# Patient Record
Sex: FEMALE | Race: OTHER | NOT HISPANIC OR LATINO
[De-identification: names, ages, dates, MRNs, and addresses within clinical notes are randomized per-mention and may not be internally consistent; named-entity substitution may affect disease eponyms.]

---

## 2021-07-28 PROBLEM — Z00.00 ENCOUNTER FOR PREVENTIVE HEALTH EXAMINATION: Status: ACTIVE | Noted: 2021-07-28

## 2021-08-03 ENCOUNTER — APPOINTMENT (OUTPATIENT)
Dept: OBGYN | Facility: CLINIC | Age: 41
End: 2021-08-03
Payer: COMMERCIAL

## 2021-08-03 ENCOUNTER — ASOB RESULT (OUTPATIENT)
Age: 41
End: 2021-08-03

## 2021-08-03 VITALS
BODY MASS INDEX: 27.19 KG/M2 | HEART RATE: 84 BPM | DIASTOLIC BLOOD PRESSURE: 73 MMHG | HEIGHT: 61 IN | WEIGHT: 144 LBS | SYSTOLIC BLOOD PRESSURE: 109 MMHG

## 2021-08-03 PROCEDURE — 99203 OFFICE O/P NEW LOW 30 MIN: CPT

## 2021-08-03 PROCEDURE — 76801 OB US < 14 WKS SINGLE FETUS: CPT

## 2021-08-27 ENCOUNTER — APPOINTMENT (OUTPATIENT)
Dept: ANTEPARTUM | Facility: CLINIC | Age: 41
End: 2021-08-27
Payer: COMMERCIAL

## 2021-08-27 ENCOUNTER — NON-APPOINTMENT (OUTPATIENT)
Age: 41
End: 2021-08-27

## 2021-08-27 ENCOUNTER — ASOB RESULT (OUTPATIENT)
Age: 41
End: 2021-08-27

## 2021-08-27 VITALS — BODY MASS INDEX: 27.02 KG/M2 | WEIGHT: 143 LBS

## 2021-08-27 PROCEDURE — 76813 OB US NUCHAL MEAS 1 GEST: CPT

## 2021-08-30 LAB
1ST TRIMESTER DATA: NORMAL
ADDENDUM DOC: NORMAL
AFP PNL SERPL: NORMAL
AFP SERPL-ACNC: NORMAL
CLINICAL BIOCHEMIST REVIEW: NORMAL
FREE BETA HCG 1ST TRIMESTER: NORMAL
Lab: NORMAL
NOTES NTD: NORMAL
NT: NORMAL
PAPP-A SERPL-ACNC: NORMAL
TRISOMY 18/3: NORMAL

## 2021-09-10 ENCOUNTER — APPOINTMENT (OUTPATIENT)
Dept: OBGYN | Facility: CLINIC | Age: 41
End: 2021-09-10
Payer: COMMERCIAL

## 2021-09-10 ENCOUNTER — ASOB RESULT (OUTPATIENT)
Age: 41
End: 2021-09-10

## 2021-09-10 VITALS
HEART RATE: 101 BPM | WEIGHT: 145.44 LBS | SYSTOLIC BLOOD PRESSURE: 109 MMHG | DIASTOLIC BLOOD PRESSURE: 70 MMHG | HEIGHT: 61 IN | BODY MASS INDEX: 27.46 KG/M2

## 2021-09-10 PROCEDURE — 76815 OB US LIMITED FETUS(S): CPT

## 2021-09-10 PROCEDURE — 0500F INITIAL PRENATAL CARE VISIT: CPT

## 2021-09-24 ENCOUNTER — TRANSCRIPTION ENCOUNTER (OUTPATIENT)
Age: 41
End: 2021-09-24

## 2021-09-28 ENCOUNTER — TRANSCRIPTION ENCOUNTER (OUTPATIENT)
Age: 41
End: 2021-09-28

## 2021-10-06 ENCOUNTER — TRANSCRIPTION ENCOUNTER (OUTPATIENT)
Age: 41
End: 2021-10-06

## 2021-10-08 ENCOUNTER — APPOINTMENT (OUTPATIENT)
Dept: OBGYN | Facility: CLINIC | Age: 41
End: 2021-10-08
Payer: COMMERCIAL

## 2021-10-08 ENCOUNTER — LABORATORY RESULT (OUTPATIENT)
Age: 41
End: 2021-10-08

## 2021-10-08 VITALS
HEART RATE: 88 BPM | SYSTOLIC BLOOD PRESSURE: 112 MMHG | HEIGHT: 61 IN | DIASTOLIC BLOOD PRESSURE: 75 MMHG | BODY MASS INDEX: 28.15 KG/M2 | WEIGHT: 149.13 LBS

## 2021-10-08 PROCEDURE — 0502F SUBSEQUENT PRENATAL CARE: CPT

## 2021-10-13 ENCOUNTER — TRANSCRIPTION ENCOUNTER (OUTPATIENT)
Age: 41
End: 2021-10-13

## 2021-10-19 ENCOUNTER — APPOINTMENT (OUTPATIENT)
Dept: ANTEPARTUM | Facility: CLINIC | Age: 41
End: 2021-10-19
Payer: COMMERCIAL

## 2021-10-19 ENCOUNTER — ASOB RESULT (OUTPATIENT)
Age: 41
End: 2021-10-19

## 2021-10-19 PROCEDURE — 76811 OB US DETAILED SNGL FETUS: CPT

## 2021-11-01 ENCOUNTER — TRANSCRIPTION ENCOUNTER (OUTPATIENT)
Age: 41
End: 2021-11-01

## 2021-11-05 ENCOUNTER — APPOINTMENT (OUTPATIENT)
Dept: OBGYN | Facility: CLINIC | Age: 41
End: 2021-11-05
Payer: COMMERCIAL

## 2021-11-05 VITALS
BODY MASS INDEX: 28.89 KG/M2 | DIASTOLIC BLOOD PRESSURE: 78 MMHG | SYSTOLIC BLOOD PRESSURE: 123 MMHG | HEIGHT: 61 IN | WEIGHT: 153 LBS | HEART RATE: 112 BPM

## 2021-11-05 PROCEDURE — 0502F SUBSEQUENT PRENATAL CARE: CPT

## 2021-12-03 ENCOUNTER — APPOINTMENT (OUTPATIENT)
Dept: OBGYN | Facility: CLINIC | Age: 41
End: 2021-12-03
Payer: COMMERCIAL

## 2021-12-03 VITALS
BODY MASS INDEX: 29.83 KG/M2 | HEART RATE: 97 BPM | HEIGHT: 61 IN | DIASTOLIC BLOOD PRESSURE: 79 MMHG | SYSTOLIC BLOOD PRESSURE: 124 MMHG | WEIGHT: 158 LBS

## 2021-12-03 DIAGNOSIS — Z23 ENCOUNTER FOR IMMUNIZATION: ICD-10-CM

## 2021-12-03 DIAGNOSIS — Z34.92 ENCOUNTER FOR SUPERVISION OF NORMAL PREGNANCY, UNSPECIFIED, SECOND TRIMESTER: ICD-10-CM

## 2021-12-03 PROCEDURE — 90472 IMMUNIZATION ADMIN EACH ADD: CPT

## 2021-12-03 PROCEDURE — G0008: CPT

## 2021-12-03 PROCEDURE — 90686 IIV4 VACC NO PRSV 0.5 ML IM: CPT

## 2021-12-04 LAB
BASOPHILS # BLD AUTO: 0.01 K/UL
BASOPHILS NFR BLD AUTO: 0.2 %
EOSINOPHIL # BLD AUTO: 0.04 K/UL
EOSINOPHIL NFR BLD AUTO: 0.7 %
GLUCOSE 1H P 50 G GLC PO SERPL-MCNC: 70 MG/DL
HCT VFR BLD CALC: 34 %
HGB BLD-MCNC: 10.8 G/DL
IMM GRANULOCYTES NFR BLD AUTO: 0.5 %
LYMPHOCYTES # BLD AUTO: 1.28 K/UL
LYMPHOCYTES NFR BLD AUTO: 22.4 %
MAN DIFF?: NORMAL
MCHC RBC-ENTMCNC: 29.1 PG
MCHC RBC-ENTMCNC: 31.8 GM/DL
MCV RBC AUTO: 91.6 FL
MONOCYTES # BLD AUTO: 0.62 K/UL
MONOCYTES NFR BLD AUTO: 10.8 %
NEUTROPHILS # BLD AUTO: 3.74 K/UL
NEUTROPHILS NFR BLD AUTO: 65.4 %
PLATELET # BLD AUTO: 308 K/UL
RBC # BLD: 3.71 M/UL
RBC # FLD: 13.8 %
WBC # FLD AUTO: 5.72 K/UL

## 2021-12-08 ENCOUNTER — TRANSCRIPTION ENCOUNTER (OUTPATIENT)
Age: 41
End: 2021-12-08

## 2021-12-08 ENCOUNTER — NON-APPOINTMENT (OUTPATIENT)
Age: 41
End: 2021-12-08

## 2021-12-08 DIAGNOSIS — M79.669 PAIN IN UNSPECIFIED LOWER LEG: ICD-10-CM

## 2021-12-09 ENCOUNTER — NON-APPOINTMENT (OUTPATIENT)
Age: 41
End: 2021-12-09

## 2021-12-17 ENCOUNTER — OUTPATIENT (OUTPATIENT)
Dept: OUTPATIENT SERVICES | Facility: HOSPITAL | Age: 41
LOS: 1 days | End: 2021-12-17
Payer: COMMERCIAL

## 2021-12-17 ENCOUNTER — APPOINTMENT (OUTPATIENT)
Dept: ULTRASOUND IMAGING | Facility: IMAGING CENTER | Age: 41
End: 2021-12-17
Payer: COMMERCIAL

## 2021-12-17 DIAGNOSIS — M79.669 PAIN IN UNSPECIFIED LOWER LEG: ICD-10-CM

## 2021-12-17 PROCEDURE — 93970 EXTREMITY STUDY: CPT | Mod: 26

## 2021-12-17 PROCEDURE — 93970 EXTREMITY STUDY: CPT

## 2022-01-05 ENCOUNTER — NON-APPOINTMENT (OUTPATIENT)
Age: 42
End: 2022-01-05

## 2022-01-05 ENCOUNTER — APPOINTMENT (OUTPATIENT)
Dept: OBGYN | Facility: CLINIC | Age: 42
End: 2022-01-05
Payer: COMMERCIAL

## 2022-01-05 VITALS
SYSTOLIC BLOOD PRESSURE: 117 MMHG | BODY MASS INDEX: 30.96 KG/M2 | WEIGHT: 164 LBS | TEMPERATURE: 97.2 F | DIASTOLIC BLOOD PRESSURE: 78 MMHG | HEIGHT: 61 IN

## 2022-01-05 PROCEDURE — 90471 IMMUNIZATION ADMIN: CPT

## 2022-01-05 PROCEDURE — 0502F SUBSEQUENT PRENATAL CARE: CPT

## 2022-01-05 PROCEDURE — 90715 TDAP VACCINE 7 YRS/> IM: CPT

## 2022-01-11 ENCOUNTER — ASOB RESULT (OUTPATIENT)
Age: 42
End: 2022-01-11

## 2022-01-11 ENCOUNTER — APPOINTMENT (OUTPATIENT)
Dept: ANTEPARTUM | Facility: CLINIC | Age: 42
End: 2022-01-11
Payer: COMMERCIAL

## 2022-01-11 PROCEDURE — 76816 OB US FOLLOW-UP PER FETUS: CPT

## 2022-01-19 ENCOUNTER — APPOINTMENT (OUTPATIENT)
Dept: OBGYN | Facility: CLINIC | Age: 42
End: 2022-01-19
Payer: COMMERCIAL

## 2022-01-19 VITALS
HEART RATE: 103 BPM | BODY MASS INDEX: 31.34 KG/M2 | WEIGHT: 166 LBS | DIASTOLIC BLOOD PRESSURE: 79 MMHG | HEIGHT: 61 IN | SYSTOLIC BLOOD PRESSURE: 121 MMHG

## 2022-01-19 PROCEDURE — 0502F SUBSEQUENT PRENATAL CARE: CPT

## 2022-01-20 ENCOUNTER — NON-APPOINTMENT (OUTPATIENT)
Age: 42
End: 2022-01-20

## 2022-01-20 LAB
BASOPHILS # BLD AUTO: 0.01 K/UL
BASOPHILS NFR BLD AUTO: 0.1 %
EOSINOPHIL # BLD AUTO: 0.04 K/UL
EOSINOPHIL NFR BLD AUTO: 0.6 %
HCT VFR BLD CALC: 36.5 %
HGB BLD-MCNC: 11.7 G/DL
IMM GRANULOCYTES NFR BLD AUTO: 0.7 %
LYMPHOCYTES # BLD AUTO: 1.37 K/UL
LYMPHOCYTES NFR BLD AUTO: 19.2 %
MAN DIFF?: NORMAL
MCHC RBC-ENTMCNC: 28.7 PG
MCHC RBC-ENTMCNC: 32.1 GM/DL
MCV RBC AUTO: 89.7 FL
MONOCYTES # BLD AUTO: 0.53 K/UL
MONOCYTES NFR BLD AUTO: 7.4 %
NEUTROPHILS # BLD AUTO: 5.13 K/UL
NEUTROPHILS NFR BLD AUTO: 72 %
PLATELET # BLD AUTO: 290 K/UL
RBC # BLD: 4.07 M/UL
RBC # FLD: 13.4 %
WBC # FLD AUTO: 7.13 K/UL

## 2022-02-03 ENCOUNTER — NON-APPOINTMENT (OUTPATIENT)
Age: 42
End: 2022-02-03

## 2022-02-03 ENCOUNTER — APPOINTMENT (OUTPATIENT)
Dept: OBGYN | Facility: CLINIC | Age: 42
End: 2022-02-03
Payer: COMMERCIAL

## 2022-02-03 VITALS
HEART RATE: 103 BPM | BODY MASS INDEX: 32.1 KG/M2 | SYSTOLIC BLOOD PRESSURE: 131 MMHG | HEIGHT: 61 IN | TEMPERATURE: 96.3 F | WEIGHT: 170 LBS | DIASTOLIC BLOOD PRESSURE: 75 MMHG

## 2022-02-03 PROCEDURE — 0502F SUBSEQUENT PRENATAL CARE: CPT

## 2022-02-11 ENCOUNTER — ASOB RESULT (OUTPATIENT)
Age: 42
End: 2022-02-11

## 2022-02-11 ENCOUNTER — APPOINTMENT (OUTPATIENT)
Dept: ANTEPARTUM | Facility: CLINIC | Age: 42
End: 2022-02-11
Payer: COMMERCIAL

## 2022-02-11 ENCOUNTER — APPOINTMENT (OUTPATIENT)
Dept: OBGYN | Facility: CLINIC | Age: 42
End: 2022-02-11
Payer: COMMERCIAL

## 2022-02-11 VITALS
DIASTOLIC BLOOD PRESSURE: 81 MMHG | WEIGHT: 172.4 LBS | BODY MASS INDEX: 32.55 KG/M2 | HEIGHT: 61 IN | SYSTOLIC BLOOD PRESSURE: 130 MMHG | HEART RATE: 97 BPM

## 2022-02-11 LAB
BASOPHILS # BLD AUTO: 0.01 K/UL
BASOPHILS NFR BLD AUTO: 0.2 %
EOSINOPHIL # BLD AUTO: 0.03 K/UL
EOSINOPHIL NFR BLD AUTO: 0.5 %
HCT VFR BLD CALC: 36.7 %
HGB BLD-MCNC: 12.1 G/DL
HIV1+2 AB SPEC QL IA.RAPID: NONREACTIVE
IMM GRANULOCYTES NFR BLD AUTO: 0.3 %
LYMPHOCYTES # BLD AUTO: 1.23 K/UL
LYMPHOCYTES NFR BLD AUTO: 21.5 %
MAN DIFF?: NORMAL
MCHC RBC-ENTMCNC: 29.2 PG
MCHC RBC-ENTMCNC: 33 GM/DL
MCV RBC AUTO: 88.4 FL
MONOCYTES # BLD AUTO: 0.44 K/UL
MONOCYTES NFR BLD AUTO: 7.7 %
NEUTROPHILS # BLD AUTO: 4 K/UL
NEUTROPHILS NFR BLD AUTO: 69.8 %
PLATELET # BLD AUTO: 286 K/UL
RBC # BLD: 4.15 M/UL
RBC # FLD: 13.7 %
T PALLIDUM AB SER QL IA: NEGATIVE
WBC # FLD AUTO: 5.73 K/UL

## 2022-02-11 PROCEDURE — 76819 FETAL BIOPHYS PROFIL W/O NST: CPT

## 2022-02-11 PROCEDURE — 76816 OB US FOLLOW-UP PER FETUS: CPT

## 2022-02-11 PROCEDURE — 0502F SUBSEQUENT PRENATAL CARE: CPT

## 2022-02-13 LAB
GP B STREP DNA SPEC QL NAA+PROBE: NORMAL
GP B STREP DNA SPEC QL NAA+PROBE: NOT DETECTED
SOURCE GBS: NORMAL

## 2022-02-15 ENCOUNTER — APPOINTMENT (OUTPATIENT)
Dept: OBGYN | Facility: CLINIC | Age: 42
End: 2022-02-15
Payer: COMMERCIAL

## 2022-02-15 VITALS
BODY MASS INDEX: 32.56 KG/M2 | DIASTOLIC BLOOD PRESSURE: 78 MMHG | SYSTOLIC BLOOD PRESSURE: 123 MMHG | HEIGHT: 61 IN | WEIGHT: 172.44 LBS | HEART RATE: 89 BPM

## 2022-02-15 PROCEDURE — 0502F SUBSEQUENT PRENATAL CARE: CPT

## 2022-02-24 ENCOUNTER — APPOINTMENT (OUTPATIENT)
Dept: OBGYN | Facility: CLINIC | Age: 42
End: 2022-02-24
Payer: COMMERCIAL

## 2022-02-24 VITALS
HEIGHT: 61 IN | BODY MASS INDEX: 32.66 KG/M2 | SYSTOLIC BLOOD PRESSURE: 135 MMHG | DIASTOLIC BLOOD PRESSURE: 86 MMHG | HEART RATE: 91 BPM | WEIGHT: 173 LBS

## 2022-02-24 PROCEDURE — 0502F SUBSEQUENT PRENATAL CARE: CPT

## 2022-03-04 ENCOUNTER — APPOINTMENT (OUTPATIENT)
Dept: OBGYN | Facility: CLINIC | Age: 42
End: 2022-03-04
Payer: COMMERCIAL

## 2022-03-04 VITALS
HEIGHT: 61 IN | DIASTOLIC BLOOD PRESSURE: 80 MMHG | BODY MASS INDEX: 32.47 KG/M2 | SYSTOLIC BLOOD PRESSURE: 118 MMHG | WEIGHT: 172 LBS

## 2022-03-04 PROCEDURE — 0502F SUBSEQUENT PRENATAL CARE: CPT

## 2022-03-07 ENCOUNTER — TRANSCRIPTION ENCOUNTER (OUTPATIENT)
Age: 42
End: 2022-03-07

## 2022-03-09 ENCOUNTER — APPOINTMENT (OUTPATIENT)
Dept: OBGYN | Facility: CLINIC | Age: 42
End: 2022-03-09

## 2022-03-09 ENCOUNTER — NON-APPOINTMENT (OUTPATIENT)
Age: 42
End: 2022-03-09

## 2022-03-09 ENCOUNTER — INPATIENT (INPATIENT)
Facility: HOSPITAL | Age: 42
LOS: 3 days | Discharge: HOME | End: 2022-03-13
Attending: STUDENT IN AN ORGANIZED HEALTH CARE EDUCATION/TRAINING PROGRAM | Admitting: STUDENT IN AN ORGANIZED HEALTH CARE EDUCATION/TRAINING PROGRAM
Payer: COMMERCIAL

## 2022-03-09 VITALS
TEMPERATURE: 98 F | HEART RATE: 89 BPM | RESPIRATION RATE: 16 BRPM | SYSTOLIC BLOOD PRESSURE: 125 MMHG | DIASTOLIC BLOOD PRESSURE: 70 MMHG

## 2022-03-09 DIAGNOSIS — O41.00X0 OLIGOHYDRAMNIOS, UNSPECIFIED TRIMESTER, NOT APPLICABLE OR UNSPECIFIED: ICD-10-CM

## 2022-03-09 DIAGNOSIS — O26.899 OTHER SPECIFIED PREGNANCY RELATED CONDITIONS, UNSPECIFIED TRIMESTER: ICD-10-CM

## 2022-03-09 DIAGNOSIS — Z3A.00 WEEKS OF GESTATION OF PREGNANCY NOT SPECIFIED: ICD-10-CM

## 2022-03-09 LAB
BASOPHILS # BLD AUTO: 0.01 K/UL — SIGNIFICANT CHANGE UP (ref 0–0.2)
BASOPHILS NFR BLD AUTO: 0.2 % — SIGNIFICANT CHANGE UP (ref 0–2)
BLD GP AB SCN SERPL QL: NEGATIVE — SIGNIFICANT CHANGE UP
COVID-19 SPIKE DOMAIN AB INTERP: POSITIVE
COVID-19 SPIKE DOMAIN ANTIBODY RESULT: 210 U/ML — HIGH
EOSINOPHIL # BLD AUTO: 0.01 K/UL — SIGNIFICANT CHANGE UP (ref 0–0.5)
EOSINOPHIL NFR BLD AUTO: 0.2 % — SIGNIFICANT CHANGE UP (ref 0–6)
HCT VFR BLD CALC: 37 % — SIGNIFICANT CHANGE UP (ref 34.5–45)
HGB BLD-MCNC: 12.6 G/DL — SIGNIFICANT CHANGE UP (ref 11.5–15.5)
IANC: 3.71 K/UL — SIGNIFICANT CHANGE UP (ref 1.5–8.5)
IMM GRANULOCYTES NFR BLD AUTO: 0.5 % — SIGNIFICANT CHANGE UP (ref 0–1.5)
LYMPHOCYTES # BLD AUTO: 1.52 K/UL — SIGNIFICANT CHANGE UP (ref 1–3.3)
LYMPHOCYTES # BLD AUTO: 27 % — SIGNIFICANT CHANGE UP (ref 13–44)
MCHC RBC-ENTMCNC: 29.9 PG — SIGNIFICANT CHANGE UP (ref 27–34)
MCHC RBC-ENTMCNC: 34.1 GM/DL — SIGNIFICANT CHANGE UP (ref 32–36)
MCV RBC AUTO: 87.7 FL — SIGNIFICANT CHANGE UP (ref 80–100)
MONOCYTES # BLD AUTO: 0.34 K/UL — SIGNIFICANT CHANGE UP (ref 0–0.9)
MONOCYTES NFR BLD AUTO: 6 % — SIGNIFICANT CHANGE UP (ref 2–14)
NEUTROPHILS # BLD AUTO: 3.71 K/UL — SIGNIFICANT CHANGE UP (ref 1.8–7.4)
NEUTROPHILS NFR BLD AUTO: 66.1 % — SIGNIFICANT CHANGE UP (ref 43–77)
NRBC # BLD: 0 /100 WBCS — SIGNIFICANT CHANGE UP
NRBC # FLD: 0 K/UL — SIGNIFICANT CHANGE UP
PLATELET # BLD AUTO: 232 K/UL — SIGNIFICANT CHANGE UP (ref 150–400)
RBC # BLD: 4.22 M/UL — SIGNIFICANT CHANGE UP (ref 3.8–5.2)
RBC # FLD: 14 % — SIGNIFICANT CHANGE UP (ref 10.3–14.5)
RH IG SCN BLD-IMP: POSITIVE — SIGNIFICANT CHANGE UP
RH IG SCN BLD-IMP: POSITIVE — SIGNIFICANT CHANGE UP
SARS-COV-2 IGG+IGM SERPL QL IA: 210 U/ML — HIGH
SARS-COV-2 IGG+IGM SERPL QL IA: POSITIVE
SARS-COV-2 RNA SPEC QL NAA+PROBE: SIGNIFICANT CHANGE UP
WBC # BLD: 5.62 K/UL — SIGNIFICANT CHANGE UP (ref 3.8–10.5)
WBC # FLD AUTO: 5.62 K/UL — SIGNIFICANT CHANGE UP (ref 3.8–10.5)

## 2022-03-09 RX ORDER — OXYTOCIN 10 UNIT/ML
333.33 VIAL (ML) INJECTION
Qty: 20 | Refills: 0 | Status: DISCONTINUED | OUTPATIENT
Start: 2022-03-09 | End: 2022-03-11

## 2022-03-09 RX ORDER — SODIUM CHLORIDE 9 MG/ML
1000 INJECTION, SOLUTION INTRAVENOUS
Refills: 0 | Status: DISCONTINUED | OUTPATIENT
Start: 2022-03-09 | End: 2022-03-10

## 2022-03-09 RX ORDER — OXYTOCIN 10 UNIT/ML
333.33 VIAL (ML) INJECTION
Qty: 20 | Refills: 0 | Status: DISCONTINUED | OUTPATIENT
Start: 2022-03-09 | End: 2022-03-09

## 2022-03-09 RX ORDER — SODIUM CHLORIDE 9 MG/ML
1000 INJECTION, SOLUTION INTRAVENOUS
Refills: 0 | Status: DISCONTINUED | OUTPATIENT
Start: 2022-03-09 | End: 2022-03-09

## 2022-03-09 NOTE — OB RN PATIENT PROFILE - NS_OBGYNHISTORY_OBGYN_ALL_OB_FT
AP course uncomplicated.     OB HX: Denies   GYN HX : Abnormal pap with colposcopy- patient to f/u after delivery.

## 2022-03-09 NOTE — OB PROVIDER H&P - NSHPPHYSICALEXAM_GEN_ALL_CORE
Assessment reveals: VSS, abdomen soft non tender on palp.   Cat 1 FHT, Irregular contractions on toco.   SSE: No pooling, Nitrazine negative, negative FERN. Cervix appears closed.   SVE: 0/20/-3  T(C): 36.8 (03-09-22 @ 10:29), Max: 36.8 (03-09-22 @ 10:12)  HR: 71 (03-09-22 @ 11:14) (71 - 89)  BP: 111/67 (03-09-22 @ 11:14) (110/58 - 132/80)  RR: 16 (03-09-22 @ 10:12) (16 - 16)  SpO2: --

## 2022-03-09 NOTE — OB RN TRIAGE NOTE - FALL HARM RISK - UNIVERSAL INTERVENTIONS
Bed in lowest position, wheels locked, appropriate side rails in place/Call bell, personal items and telephone in reach/Instruct patient to call for assistance before getting out of bed or chair/Non-slip footwear when patient is out of bed/[place] to call system/Physically safe environment - no spills, clutter or unnecessary equipment/Purposeful Proactive Rounding/Room/bathroom lighting operational, light cord in reach

## 2022-03-09 NOTE — OB PROVIDER H&P - HISTORY OF PRESENT ILLNESS
41 year old  at 40.1 presents with c/o cramps that started around 0430 am, states the cramps subsided since being here. Patient states decreased fetal movement since 7-9 am, reports positive fetal movement. Patient states panties were wet and unsure if it was LOF or urine, states since she showered this AM denies any LOF @ this time ap care uncomplicated thus far

## 2022-03-09 NOTE — OB PROVIDER TRIAGE NOTE - NSHPPHYSICALEXAM_GEN_ALL_CORE
Assessment reveals: VSS, abdomen soft non tender on palp.   Cat 1 FHT, Irregular contractions on toco.   VE 0/0/-3  SSE: No pooling, Nitrazine negative, negative FERN. Cervix appears closed.     1114: 111/67 HR 71. Assessment reveals: VSS, abdomen soft non tender on palp.   Cat 1 FHT, Irregular contractions on toco.   SSE: No pooling, Nitrazine negative, negative FERN. Cervix appears closed.   SVE: 0/20/-3  T(C): 36.8 (03-09-22 @ 10:29), Max: 36.8 (03-09-22 @ 10:12)  HR: 71 (03-09-22 @ 11:14) (71 - 89)  BP: 111/67 (03-09-22 @ 11:14) (110/58 - 132/80)  RR: 16 (03-09-22 @ 10:12) (16 - 16)  SpO2: --

## 2022-03-09 NOTE — OB PROVIDER IHI INDUCTION/AUGMENTATION NOTE - NS_IHIPITOCINATTEND_OBGYN_ALL_OB_FT
Patient is in stable condition. Provided discharge instructions and follow-up information with understanding verbalized. Agrees to seek medical attention for any new or worsening condition.   Patient ambulated from ED with steady gait evy

## 2022-03-09 NOTE — OB PROVIDER TRIAGE NOTE - NSOBPROVIDERNOTE_OBGYN_ALL_OB_FT
41 year old  at 40.1 presents with c/o cramps that started at 0430 am but have subsided. Patient states decreased fetal movement from 7am but now reports positive fetal movement.   EFM monitoring Cat 1 FHT.   BPP to be done     Will continue to monitor. 41 year old  at 40.1 presents with c/o cramps that started at 0430 am but have subsided. Patient states decreased fetal movement from 7am but now reports positive fetal movement.   EFM monitoring Cat 1 FHT.   VE 0/0/-3  BPP to be done     Will continue to monitor.    1150: BPP 6/8. Vtx presentation, Anterior placenta, AYANA 1.93.   category 1 FHT  toco: irregular uterine contractions   Plan discussed with Dr. Newell   IOL with PO Cytotec.  GBS- neg 2022  pain management PRN

## 2022-03-09 NOTE — OB RN TRIAGE NOTE - NSICDXPASTMEDICALHX_GEN_ALL_CORE_FT
PAST MEDICAL HISTORY:  No pertinent past medical history PAST MEDICAL HISTORY:  History of blood transfusion at 6 years of ago d/t rxn to codeine

## 2022-03-09 NOTE — OB PROVIDER TRIAGE NOTE - NSICDXPASTMEDICALHX_GEN_ALL_CORE_FT
PAST MEDICAL HISTORY:  History of blood transfusion at 6 years of ago d/t rxn to codeine     PAST MEDICAL HISTORY:  History of blood transfusion at 6 years of age  d/t rxn to codeine

## 2022-03-09 NOTE — OB PROVIDER TRIAGE NOTE - HISTORY OF PRESENT ILLNESS
41 year old  at 40.1 presents with c/o cramps that started around 0430 am, states the cramps subsided since being here. Patient states decreased fetal movement since 7-9 am, reports positive fetal movement. Patient states panties were wet and unsure if it was LOF or urine.  41 year old  at 40.1 presents with c/o cramps that started around 0430 am, states the cramps subsided since being here. Patient states decreased fetal movement since 7-9 am, reports positive fetal movement. Patient states panties were wet and unsure if it was LOF or urine, states since she showered this AM denies any LOF @ this time ap care uncomplicated thus far

## 2022-03-09 NOTE — OB PROVIDER H&P - ASSESSMENT
41 year old  at 40.1 weeks gestation with Oligohydraminos AYANA 1.93.   Plan of care discussed with Dr. Newell   Admit to L&D for PO cytotec IOL   GBS negative      see admission orders  41 year old  at 40.1 weeks gestation with Oligohydraminos AYANA 1.93.   Plan of care discussed with Dr. Newell   Admit to L&D for PO cytotec IOL   GBS negative      see admission orders   Dr. Beverly notified of patient admission

## 2022-03-09 NOTE — OB PROVIDER TRIAGE NOTE - NS_OBGYNHISTORY_OBGYN_ALL_OB_FT
AP course uncomplicated.     OB HX: Denies   GYN: Abnormal pap with colposcopy- patient to f/u after delivery. AP course uncomplicated.     OB HX: Denies   GYN HX : Abnormal pap with colposcopy- patient to f/u after delivery.

## 2022-03-09 NOTE — OB RN TRIAGE NOTE - FALL HARM RISK - PATIENT NEEDS ASSISTANCE
"Anesthesia Transfer of Care Note    Patient: Charis Wells    Procedure(s) Performed: Procedure(s) (LRB):   SECTION (N/A)    Patient location: PACU    Anesthesia Type: spinal    Transport from OR: Transported from OR on room air with adequate spontaneous ventilation    Post pain: adequate analgesia    Post assessment: no apparent anesthetic complications    Post vital signs: stable    Level of consciousness: awake    Nausea/Vomiting: no nausea/vomiting    Complications: none    Transfer of care protocol was followed      Last vitals:   Visit Vitals  Ht 5' 3" (1.6 m)   Wt 93.9 kg (207 lb)   LMP 2019   Breastfeeding? No   BMI 36.67 kg/m²     " No assistance needed

## 2022-03-10 LAB
HBV SURFACE AG SERPL QL IA: SIGNIFICANT CHANGE UP
RUBV IGG SER-ACNC: 4.2 INDEX — SIGNIFICANT CHANGE UP
RUBV IGG SER-IMP: POSITIVE — SIGNIFICANT CHANGE UP
T PALLIDUM AB TITR SER: NEGATIVE — SIGNIFICANT CHANGE UP

## 2022-03-10 RX ORDER — OXYTOCIN 10 UNIT/ML
1 VIAL (ML) INJECTION
Qty: 30 | Refills: 0 | Status: DISCONTINUED | OUTPATIENT
Start: 2022-03-10 | End: 2022-03-11

## 2022-03-10 RX ADMIN — Medication 1 MILLIUNIT(S)/MIN: at 17:33

## 2022-03-11 ENCOUNTER — RESULT REVIEW (OUTPATIENT)
Age: 42
End: 2022-03-11

## 2022-03-11 ENCOUNTER — TRANSCRIPTION ENCOUNTER (OUTPATIENT)
Age: 42
End: 2022-03-11

## 2022-03-11 PROCEDURE — 88307 TISSUE EXAM BY PATHOLOGIST: CPT | Mod: 26

## 2022-03-11 PROCEDURE — 59400 OBSTETRICAL CARE: CPT | Mod: U9,UB,GC

## 2022-03-11 RX ORDER — IBUPROFEN 200 MG
600 TABLET ORAL EVERY 6 HOURS
Refills: 0 | Status: DISCONTINUED | OUTPATIENT
Start: 2022-03-11 | End: 2022-03-13

## 2022-03-11 RX ORDER — DIPHENHYDRAMINE HCL 50 MG
25 CAPSULE ORAL EVERY 6 HOURS
Refills: 0 | Status: DISCONTINUED | OUTPATIENT
Start: 2022-03-11 | End: 2022-03-13

## 2022-03-11 RX ORDER — IBUPROFEN 200 MG
1 TABLET ORAL
Qty: 0 | Refills: 0 | DISCHARGE
Start: 2022-03-11

## 2022-03-11 RX ORDER — LANOLIN
1 OINTMENT (GRAM) TOPICAL EVERY 6 HOURS
Refills: 0 | Status: DISCONTINUED | OUTPATIENT
Start: 2022-03-11 | End: 2022-03-13

## 2022-03-11 RX ORDER — ACETAMINOPHEN 500 MG
975 TABLET ORAL ONCE
Refills: 0 | Status: DISCONTINUED | OUTPATIENT
Start: 2022-03-11 | End: 2022-03-11

## 2022-03-11 RX ORDER — OXYTOCIN 10 UNIT/ML
333.33 VIAL (ML) INJECTION
Qty: 20 | Refills: 0 | Status: DISCONTINUED | OUTPATIENT
Start: 2022-03-11 | End: 2022-03-11

## 2022-03-11 RX ORDER — BENZOCAINE 10 %
1 GEL (GRAM) MUCOUS MEMBRANE EVERY 6 HOURS
Refills: 0 | Status: DISCONTINUED | OUTPATIENT
Start: 2022-03-11 | End: 2022-03-13

## 2022-03-11 RX ORDER — ERTAPENEM SODIUM 1 G/1
1000 INJECTION, POWDER, LYOPHILIZED, FOR SOLUTION INTRAMUSCULAR; INTRAVENOUS ONCE
Refills: 0 | Status: DISCONTINUED | OUTPATIENT
Start: 2022-03-11 | End: 2022-03-11

## 2022-03-11 RX ORDER — PRAMOXINE HYDROCHLORIDE 150 MG/15G
1 AEROSOL, FOAM RECTAL EVERY 4 HOURS
Refills: 0 | Status: DISCONTINUED | OUTPATIENT
Start: 2022-03-11 | End: 2022-03-13

## 2022-03-11 RX ORDER — ACETAMINOPHEN 500 MG
975 TABLET ORAL ONCE
Refills: 0 | Status: COMPLETED | OUTPATIENT
Start: 2022-03-11 | End: 2022-03-11

## 2022-03-11 RX ORDER — OXYTOCIN 10 UNIT/ML
2 VIAL (ML) INJECTION
Qty: 30 | Refills: 0 | Status: DISCONTINUED | OUTPATIENT
Start: 2022-03-11 | End: 2022-03-13

## 2022-03-11 RX ORDER — SODIUM CHLORIDE 9 MG/ML
3 INJECTION INTRAMUSCULAR; INTRAVENOUS; SUBCUTANEOUS EVERY 8 HOURS
Refills: 0 | Status: DISCONTINUED | OUTPATIENT
Start: 2022-03-11 | End: 2022-03-13

## 2022-03-11 RX ORDER — HYDROCORTISONE 1 %
1 OINTMENT (GRAM) TOPICAL EVERY 6 HOURS
Refills: 0 | Status: DISCONTINUED | OUTPATIENT
Start: 2022-03-11 | End: 2022-03-13

## 2022-03-11 RX ORDER — SODIUM CHLORIDE 9 MG/ML
1000 INJECTION, SOLUTION INTRAVENOUS
Refills: 0 | Status: DISCONTINUED | OUTPATIENT
Start: 2022-03-11 | End: 2022-03-11

## 2022-03-11 RX ORDER — KETOROLAC TROMETHAMINE 30 MG/ML
30 SYRINGE (ML) INJECTION ONCE
Refills: 0 | Status: DISCONTINUED | OUTPATIENT
Start: 2022-03-11 | End: 2022-03-13

## 2022-03-11 RX ORDER — ACETAMINOPHEN 500 MG
975 TABLET ORAL
Refills: 0 | Status: DISCONTINUED | OUTPATIENT
Start: 2022-03-11 | End: 2022-03-13

## 2022-03-11 RX ORDER — OXYTOCIN 10 UNIT/ML
VIAL (ML) INJECTION
Qty: 30 | Refills: 0 | Status: DISCONTINUED | OUTPATIENT
Start: 2022-03-11 | End: 2022-03-11

## 2022-03-11 RX ORDER — SIMETHICONE 80 MG/1
80 TABLET, CHEWABLE ORAL EVERY 4 HOURS
Refills: 0 | Status: DISCONTINUED | OUTPATIENT
Start: 2022-03-11 | End: 2022-03-13

## 2022-03-11 RX ORDER — MAGNESIUM HYDROXIDE 400 MG/1
30 TABLET, CHEWABLE ORAL
Refills: 0 | Status: DISCONTINUED | OUTPATIENT
Start: 2022-03-11 | End: 2022-03-13

## 2022-03-11 RX ORDER — ACETAMINOPHEN 500 MG
3 TABLET ORAL
Qty: 0 | Refills: 0 | DISCHARGE
Start: 2022-03-11

## 2022-03-11 RX ORDER — TETANUS TOXOID, REDUCED DIPHTHERIA TOXOID AND ACELLULAR PERTUSSIS VACCINE, ADSORBED 5; 2.5; 8; 8; 2.5 [IU]/.5ML; [IU]/.5ML; UG/.5ML; UG/.5ML; UG/.5ML
0.5 SUSPENSION INTRAMUSCULAR ONCE
Refills: 0 | Status: DISCONTINUED | OUTPATIENT
Start: 2022-03-11 | End: 2022-03-13

## 2022-03-11 RX ORDER — DIBUCAINE 1 %
1 OINTMENT (GRAM) RECTAL EVERY 6 HOURS
Refills: 0 | Status: DISCONTINUED | OUTPATIENT
Start: 2022-03-11 | End: 2022-03-13

## 2022-03-11 RX ORDER — AER TRAVELER 0.5 G/1
1 SOLUTION RECTAL; TOPICAL EVERY 4 HOURS
Refills: 0 | Status: DISCONTINUED | OUTPATIENT
Start: 2022-03-11 | End: 2022-03-13

## 2022-03-11 RX ORDER — SODIUM CHLORIDE 9 MG/ML
500 INJECTION, SOLUTION INTRAVENOUS ONCE
Refills: 0 | Status: COMPLETED | OUTPATIENT
Start: 2022-03-11 | End: 2022-03-11

## 2022-03-11 RX ORDER — OXYTOCIN 10 UNIT/ML
333.33 VIAL (ML) INJECTION
Qty: 20 | Refills: 0 | Status: DISCONTINUED | OUTPATIENT
Start: 2022-03-11 | End: 2022-03-13

## 2022-03-11 RX ORDER — IBUPROFEN 200 MG
600 TABLET ORAL EVERY 6 HOURS
Refills: 0 | Status: COMPLETED | OUTPATIENT
Start: 2022-03-11 | End: 2023-02-07

## 2022-03-11 RX ADMIN — Medication 600 MILLIGRAM(S): at 23:46

## 2022-03-11 RX ADMIN — Medication 975 MILLIGRAM(S): at 21:27

## 2022-03-11 RX ADMIN — Medication 975 MILLIGRAM(S): at 13:00

## 2022-03-11 RX ADMIN — Medication 975 MILLIGRAM(S): at 12:30

## 2022-03-11 RX ADMIN — Medication 1000 MILLIUNIT(S)/MIN: at 11:07

## 2022-03-11 RX ADMIN — Medication 975 MILLIGRAM(S): at 22:00

## 2022-03-11 RX ADMIN — SODIUM CHLORIDE 1000 MILLILITER(S): 9 INJECTION, SOLUTION INTRAVENOUS at 12:19

## 2022-03-11 NOTE — OB RN DELIVERY SUMMARY - BABY A: APGAR 5 MIN SCORE, DELIVERY
[Restricted in physically strenuous activity but ambulatory and able to carry out work of a light or sedentary nature] : Status 1- Restricted in physically strenuous activity but ambulatory and able to carry out work of a light or sedentary nature, e.g., light house work, office work [Obese] : obese [Normal] : affect appropriate [de-identified] : Eyeglasses noted [de-identified] : Some arthritic changes [de-identified] : Grade I peripheral edema, apparently not new. [de-identified] : However, obesity may be limiting the examination details. 9

## 2022-03-11 NOTE — DISCHARGE NOTE OB - PATIENT PORTAL LINK FT
You can access the FollowMyHealth Patient Portal offered by [facility] by registering at the following website: [email or web address]. By joining [facility]’s FollowMyHealth portal, you will also be able to view your health information using other applications (apps) compatible with our system.

## 2022-03-11 NOTE — OB NEONATOLOGY/PEDIATRICIAN DELIVERY SUMMARY - NSPEDSNEONOTESA_OBGYN_ALL_OB_FT
40.3 wk female born via  to a 42 y/o  blood type B+ mother. No significant maternal or prenatal history. PNL -/-/NR/I, GBS - on . SROM at 5:35 with clear fluids, approx. 3.5 hrs. Baby emerged vigorous, crying, was w/d/s/s with APGARS of 9/9. Mom plans to initiate breastfeeding, declines Hep B vaccine. EOS 0.48 with maternal temp of 38C within 1 hour of delivery. COVID negative. Admit to  nursery. Called to delivery for category 2 tracing. 40.3 wk female born via  to a 40 y/o  blood type B+ mother. No significant maternal or prenatal history. PNL -/-/NR/I, GBS - on . SROM at 5:35 with clear fluids, approx. 3.5 hrs. Baby emerged vigorous, crying, was w/d/s/s with APGARS of 9/9. Mom plans to initiate breastfeeding, declines Hep B vaccine. EOS 0.48 with maternal temp of 38C within 1 hour of delivery. COVID negative. Admit to  nursery.

## 2022-03-11 NOTE — OB PROVIDER LABOR PROGRESS NOTE - NS_OBIHIFHRDETAILS_OBGYN_ALL_OB_FT
150, mod chiqui, +accels, no decels
155, mod, +accels, + variables
130, mod chiqui, +accels, no decels
145/mod/(-)accels/(-)decels
145/mod/+accels no decels

## 2022-03-11 NOTE — PROVIDER CONTACT NOTE (OTHER) - ASSESSMENT
temp: 38.0 rectal, asymptomatic
Lying: /55 HR 76   Sitting: /54 HR 95  Standing: BP 81/50   pt denies any symptoms, pt returned to bed at this time.  pt firm at the umbilicus with light to moderate bleeding. pt returned to bed at this time.
FUNDUS FIRM AT UMBILICUS , BLADDER SLIGHTLY DISTENDED , MINIMAL VAGINAL BLEEDING ,

## 2022-03-11 NOTE — OB RN DELIVERY SUMMARY - NSSELHIDDEN_OBGYN_ALL_OB_FT
[NS_DeliveryAttending1_OBGYN_ALL_OB_FT:[email or web address]=] [NS_DeliveryAttending1_OBGYN_ALL_OB_FT:[email or web address]=],[NS_DeliveryRN_OBGYN_ALL_OB_FT:[id number]=],[NS_CirculateRN2_OBGYN_ALL_OB_FT:[id number]=]

## 2022-03-11 NOTE — DISCHARGE NOTE OB - CARE PROVIDERS DIRECT ADDRESSES
[email or web address]

## 2022-03-11 NOTE — DISCHARGE NOTE OB - MEDICATION SUMMARY - MEDICATIONS TO TAKE
I will START or STAY ON the medications listed below when I get home from the hospital:    ibuprofen 600 mg oral tablet  -- 1 tab(s) by mouth every 6 hours, As Needed  -- Indication: For Vaginal delivery    acetaminophen 325 mg oral tablet  -- 3 tab(s) by mouth every 8 hours, As Needed  -- Indication: For Vaginal delivery

## 2022-03-11 NOTE — OB RN DELIVERY SUMMARY - NS_LABORCHARACTER_OBGYN_ALL_OB
Augmentation of labor/Febrile (>38C)/External electronic FM Induction of labor-Medicinal/Augmentation of labor/Febrile (>38C)/External electronic FM

## 2022-03-11 NOTE — DISCHARGE NOTE OB - NS MD DC FALL RISK RISK
For information on Fall & Injury Prevention, visit: [email or web address] OR  [email or web address] OR  https://www.cdc.gov/steadi/patient.html

## 2022-03-11 NOTE — OB PROVIDER LABOR PROGRESS NOTE - ASSESSMENT
42yo  40w2d here for oligo IOL. Early in induction.    -c/w PO cytotec course  -epi PRN, patient declining at this time    D/w Dr. Miguel Beverly PGY-4
- s/p CRB  - Will c/w Pitocin  - Cat 1 tracing  - Epi/IV analgesia PRN     Garret Akers  PGY-1, Obstetrics & Gynecology 
#Labor   - c/w Pitocin  - Bag appreciated on SVE. Unclear if SROM vs. forebag    #Fetal Wellbeing   - Cat 1    #Pain Control   - sp Epidural     Garret Akers, PGY-1    Plan per Dr. Pimentel 
IOL for oligo now fully dilated and pushing. Fetal status is reassuring.    -continue pushing   -OA position    D/w Dr. Viviana Beverly PGY-4
-c/w pit  -pt to begin to push when feeling pressure. no sensation at this time    Celestina PGY2
Plan is to place CB given patient continues to be minimally dilated, albeit cervical effacement.    - CB placed@425p  - soft cervix, start pitocin 1x1    Amyeo Afroz Jereen, PGY-1  dw Dr Rivera

## 2022-03-11 NOTE — OB PROVIDER LABOR PROGRESS NOTE - NSVAGINALEXAM_OBGYN_ALL_OB_DT
10-Mar-2022 08:05
10-Mar-2022 16:26
11-Mar-2022 05:43
11-Mar-2022 07:00
11-Mar-2022 08:52
10-Mar-2022 21:22

## 2022-03-11 NOTE — OB PROVIDER LABOR PROGRESS NOTE - NS_SUBJECTIVE/OBJECTIVE_OBGYN_ALL_OB_FT
PGY1 Labor & Delivery Progress Note     Pt examined @ 0543 due to possible SROM    T(C): 36.6 (03-11-22 @ 05:37), Max: 37.3 (03-10-22 @ 17:33)  HR: 73 (03-11-22 @ 06:08) (53 - 91)  BP: 120/70 (03-11-22 @ 06:02) (96/50 - 147/65)  RR: --  SpO2: 100% (03-11-22 @ 06:08) (92% - 100%)
Patient assessed for CRB removal
Pt s/p PO cytotec course
At bedside for VE to start pushing
Pt seen and examined for cat 2 tracing  Ruptured forbag
At bedside for VE to assess labor progress

## 2022-03-11 NOTE — PROVIDER CONTACT NOTE (OTHER) - RECOMMENDATIONS
Pt encouraged to oral hydrate and have regular diet.
ENCOURAGED FLUIDS, AMBULATION ,ASSISTED TO BATHROOM ,

## 2022-03-11 NOTE — OB RN DELIVERY SUMMARY - NS_SEPSISRSKCALC_OBGYN_ALL_OB_FT
EOS calculated successfully. EOS Risk Factor: 0.5/1000 live births ([facility] national incidence); GA=40w3d; Temp=99.14; ROM=3.417; GBS='Negative'; Antibiotics='No antibiotics or any antibiotics < 2 hrs prior to birth'   EOS calculated successfully. EOS Risk Factor: 0.5/1000 live births ([facility] national incidence); GA=40w3d; Temp=100.4; ROM=3.417; GBS='Negative'; Antibiotics='No antibiotics or any antibiotics < 2 hrs prior to birth'

## 2022-03-11 NOTE — PROVIDER CONTACT NOTE (OTHER) - ACTION/TREATMENT ORDERED:
pt to receive Invanz 1g post partum.   no tylenol to be given at this time  con't to monitor
MD NOTIFIED
POC discussed with , pt to receive 500cc bolus of LR and repeat orthostatics after. Pt educated on POC and verbalizes understanding.

## 2022-03-11 NOTE — PROVIDER CONTACT NOTE (OTHER) - SITUATION
Pt febrile post partum
Pt unable to void freely  8 hours post , voided 2 pm  100 ml In L&D  , then 50 ML @1700 ,
Post orthostatics.

## 2022-03-11 NOTE — DISCHARGE NOTE OB - HOSPITAL COURSE
Pt admitted for IOL for oligohydramnios.  She delivered a viable female infant via .  Uncomplicated PP course

## 2022-03-11 NOTE — DISCHARGE NOTE OB - CARE PLAN
Principal Discharge DX:	Normal vaginal delivery  Assessment and plan of treatment:	Nothing per vagina for 6wks   1

## 2022-03-11 NOTE — DISCHARGE NOTE OB - CARE PROVIDER_API CALL
Luc More)  OBSGYN  General  [facility], [address] [place] [address]  Phone: [phone]  Fax: [phone]  Follow Up Time:

## 2022-03-11 NOTE — OB PROVIDER DELIVERY SUMMARY - NSPROVIDERDELIVERYNOTE_OBGYN_ALL_OB_FT
Spontaneous vaginal delivery of liveborn infant from [facility] under supervision of Dr Freitas. Head, shoulders and body delivered easily. Infant was suctioned and handed off to mother/peds. Placenta delivered intact with a 3 vessel cord. Fundal massage was given and uterine fundus was found to be firm. Vaginal exam revealed an intact cervix, vaginal walls and sulci. 2nd laceration was noted and repaired with 2.0 chromic suture. Excellent hemostasis noted. Patient was stable. Count was correct x2. AGPARS 9/9. Pt was febrile (38.0 degree celsium) right after delivery with no tachycardia or signs of fetal distress. The initial plan was for Invanz x1, however, pt has a PNC allergy. The updated plan per Dr Michelle is to watch for fevers. Patient is currently doing well. PPD1 CBC with diff to be obtained and followed.    Amyeo Afroz Jereen, PGY-1

## 2022-03-11 NOTE — PROVIDER CONTACT NOTE (OTHER) - BACKGROUND
@ 0900  3/11/22 WITH 2ND DEGREE LACCERATION  WITH  ML
 of viable female @ 0900.  with 2nd degree laceration.  IOL for oligo (AYANA 1.9) and decreased FM  gbs neg   covid neg
 of viable female @ 0900.  with 2nd degree laceration.  IOL for oligo (AYANA 1.9) and decreased FM  gbs neg   covid neg  febrile 38.0C rectal postpartum

## 2022-03-12 LAB
BASOPHILS # BLD AUTO: 0.01 K/UL — SIGNIFICANT CHANGE UP (ref 0–0.2)
BASOPHILS NFR BLD AUTO: 0.1 % — SIGNIFICANT CHANGE UP (ref 0–2)
EOSINOPHIL # BLD AUTO: 0.05 K/UL — SIGNIFICANT CHANGE UP (ref 0–0.5)
EOSINOPHIL NFR BLD AUTO: 0.3 % — SIGNIFICANT CHANGE UP (ref 0–6)
HCT VFR BLD CALC: 27.1 % — LOW (ref 34.5–45)
HGB BLD-MCNC: 9 G/DL — LOW (ref 11.5–15.5)
IANC: 12.46 K/UL — HIGH (ref 1.5–8.5)
IMM GRANULOCYTES NFR BLD AUTO: 0.5 % — SIGNIFICANT CHANGE UP (ref 0–1.5)
LYMPHOCYTES # BLD AUTO: 13 % — SIGNIFICANT CHANGE UP (ref 13–44)
LYMPHOCYTES # BLD AUTO: 2.02 K/UL — SIGNIFICANT CHANGE UP (ref 1–3.3)
MCHC RBC-ENTMCNC: 29.4 PG — SIGNIFICANT CHANGE UP (ref 27–34)
MCHC RBC-ENTMCNC: 33.2 GM/DL — SIGNIFICANT CHANGE UP (ref 32–36)
MCV RBC AUTO: 88.6 FL — SIGNIFICANT CHANGE UP (ref 80–100)
MONOCYTES # BLD AUTO: 0.87 K/UL — SIGNIFICANT CHANGE UP (ref 0–0.9)
MONOCYTES NFR BLD AUTO: 5.6 % — SIGNIFICANT CHANGE UP (ref 2–14)
NEUTROPHILS # BLD AUTO: 12.46 K/UL — HIGH (ref 1.8–7.4)
NEUTROPHILS NFR BLD AUTO: 80.5 % — HIGH (ref 43–77)
NRBC # BLD: 0 /100 WBCS — SIGNIFICANT CHANGE UP
NRBC # FLD: 0 K/UL — SIGNIFICANT CHANGE UP
PLATELET # BLD AUTO: 157 K/UL — SIGNIFICANT CHANGE UP (ref 150–400)
RBC # BLD: 3.06 M/UL — LOW (ref 3.8–5.2)
RBC # FLD: 14 % — SIGNIFICANT CHANGE UP (ref 10.3–14.5)
WBC # BLD: 15.49 K/UL — HIGH (ref 3.8–10.5)
WBC # FLD AUTO: 15.49 K/UL — HIGH (ref 3.8–10.5)

## 2022-03-12 RX ADMIN — Medication 600 MILLIGRAM(S): at 06:11

## 2022-03-12 RX ADMIN — Medication 600 MILLIGRAM(S): at 12:05

## 2022-03-12 RX ADMIN — Medication 600 MILLIGRAM(S): at 18:00

## 2022-03-12 RX ADMIN — Medication 600 MILLIGRAM(S): at 00:16

## 2022-03-12 RX ADMIN — Medication 600 MILLIGRAM(S): at 12:45

## 2022-03-12 RX ADMIN — Medication 600 MILLIGRAM(S): at 05:41

## 2022-03-12 RX ADMIN — SODIUM CHLORIDE 3 MILLILITER(S): 9 INJECTION INTRAMUSCULAR; INTRAVENOUS; SUBCUTANEOUS at 11:52

## 2022-03-12 NOTE — PROGRESS NOTE ADULT - ASSESSMENT
A/P: 42yo PPD#1 s/p .  Patient is stable and doing well post-partum.     #Isolated IPT of 38.0 and 9:30a on 3/11   - Afebrile since   - Pt never received antibiotics   - No clinical signs of endometritis     #PP care  - PPD0 urinary retention.  Willis to be removed in AM. Monitor for ability to spontaneously void.   - Pain well controlled, continue current pain regimen  - Increase ambulation, SCDs when not ambulating. Discussed the importance with patient.   - Continue regular diet    Sadie Montoya MD  PGY-1

## 2022-03-12 NOTE — PROGRESS NOTE ADULT - SUBJECTIVE AND OBJECTIVE BOX
S: 42yo  PPD#1 s/p . Patient feels well. Denies F/C.  Pain is well controlled. She is tolerating a regular diet and passing flatus. Willis in place. Pt reports previously ambulating without difficulty, but has not been out of bed all night. Denies CP/SOB. Denies lightheadedness/dizziness. Denies N/V.    O:  Vitals:  Vital Signs Last 24 Hrs  T(C): 36.9 (12 Mar 2022 01:), Max: 38.0 (11 Mar 2022 09:22)  T(F): 98.4 (12 Mar 2022 01:), Max: 100.4 (11 Mar 2022 09:22)  HR: 72 (12 Mar 2022 01:29) (68 - 105)  BP: 114/63 (12 Mar 2022 01:29) (81/50 - 137/76)  BP(mean): --  RR: 17 (12 Mar 2022 01:29) (16 - 17)  SpO2: 99% (12 Mar 2022 01:29) (89% - 100%)    MEDICATIONS  (STANDING):  acetaminophen     Tablet .. 975 milliGRAM(s) Oral <User Schedule>  diphtheria/tetanus/pertussis (acellular) Vaccine (ADAcel) 0.5 milliLiter(s) IntraMuscular once  ibuprofen  Tablet. 600 milliGRAM(s) Oral every 6 hours  ketorolac   Injectable 30 milliGRAM(s) IV Push once  oxytocin Infusion 333.333 milliUNIT(s)/Min (1000 mL/Hr) IV Continuous <Continuous>  oxytocin Infusion. 2 milliUNIT(s)/Min (2 mL/Hr) IV Continuous <Continuous>  prenatal multivitamin 1 Tablet(s) Oral daily  sodium chloride 0.9% lock flush 3 milliLiter(s) IV Push every 8 hours      Labs:  Blood type: B Positive  Rubella IgG: RPR: Negative                          12.6   5.62 >-----------< 232    (  @ 14:50 )             37.0                  Physical Exam:  General: NAD  Abdomen: soft, non-tender, non-distended, fundus firm and below the umbillicus   Vaginal: Lochia wnl  Extremities: No erythema/edema. No calf tenderness

## 2022-03-13 VITALS
OXYGEN SATURATION: 99 % | SYSTOLIC BLOOD PRESSURE: 128 MMHG | TEMPERATURE: 98 F | DIASTOLIC BLOOD PRESSURE: 70 MMHG | HEART RATE: 88 BPM | RESPIRATION RATE: 19 BRPM

## 2022-03-13 RX ADMIN — Medication 975 MILLIGRAM(S): at 05:25

## 2022-03-13 RX ADMIN — Medication 600 MILLIGRAM(S): at 11:28

## 2022-03-19 PROBLEM — Z92.89 PERSONAL HISTORY OF OTHER MEDICAL TREATMENT: Chronic | Status: ACTIVE | Noted: 2022-03-09

## 2022-03-28 ENCOUNTER — TRANSCRIPTION ENCOUNTER (OUTPATIENT)
Age: 42
End: 2022-03-28

## 2022-03-29 ENCOUNTER — TRANSCRIPTION ENCOUNTER (OUTPATIENT)
Age: 42
End: 2022-03-29

## 2022-04-01 ENCOUNTER — TRANSCRIPTION ENCOUNTER (OUTPATIENT)
Age: 42
End: 2022-04-01

## 2022-04-19 ENCOUNTER — APPOINTMENT (OUTPATIENT)
Dept: OBGYN | Facility: CLINIC | Age: 42
End: 2022-04-19
Payer: COMMERCIAL

## 2022-04-19 VITALS
SYSTOLIC BLOOD PRESSURE: 109 MMHG | BODY MASS INDEX: 28.32 KG/M2 | HEIGHT: 61 IN | DIASTOLIC BLOOD PRESSURE: 79 MMHG | HEART RATE: 93 BPM | WEIGHT: 150 LBS

## 2022-04-19 DIAGNOSIS — O09.513 SUPERVISION OF ELDERLY PRIMIGRAVIDA, THIRD TRIMESTER: ICD-10-CM

## 2022-04-19 DIAGNOSIS — O35.1XX0 MATERNAL CARE FOR (SUSPECTED) CHROMOSOMAL ABNORMALITY IN FETUS, NOT APPLICABLE OR UNSPECIFIED: ICD-10-CM

## 2022-04-19 DIAGNOSIS — Z34.92 ENCOUNTER FOR SUPERVISION OF NORMAL PREGNANCY, UNSPECIFIED, SECOND TRIMESTER: ICD-10-CM

## 2022-04-19 DIAGNOSIS — Z34.93 ENCOUNTER FOR SUPERVISION OF NORMAL PREGNANCY, UNSPECIFIED, THIRD TRIMESTER: ICD-10-CM

## 2022-04-19 PROCEDURE — 0503F POSTPARTUM CARE VISIT: CPT

## 2022-04-19 RX ORDER — PNV/FERROUS SULFATE/FOLIC ACID 27-<0.5MG
TABLET ORAL
Refills: 0 | Status: ACTIVE | COMMUNITY

## 2022-04-19 NOTE — HISTORY OF PRESENT ILLNESS
[Postpartum Follow Up] : postpartum follow up [Vaginal Delivery] : vaginal delivery [Delivery Date Was ___] : delivery date was [unfilled] [Pertussis Vaccine] : Pertussis vaccine administered [Girl] : baby is a girl [Infant's Name ___] : [unfilled] [___ Lbs] : [unfilled] lbs [___ Oz] : [unfilled] oz [Living at Home] : is currently living at home [Bottle Feeding] : bottle feeding [Breastfeeding] : currently nursing [Intended Contraception] : Intended Contraception: [Condoms] : condoms [Complications:___] : no complications [] : delivered by vaginal delivery [Rhogam] : Rhogam was not administered [Rubella Vaccine] : Rubella vaccine was not administered [BTL] : no tubal ligation [BF with Difficulty] : nursing without difficulty [Resumed Menses] : has not resumed her menses [Resumed Herricks] : has not resumed intercourse [None] : No associated symptoms are reported [Awake] : awake [Alert] : alert [Acute Distress] : no acute distress [Mass] : no breast mass [Nipple Discharge] : no nipple discharge [Axillary LAD] : no axillary lymphadenopathy [Soft] : soft [Tender] : non tender [Distended] : not distended [Oriented x3] : oriented to person, place, and time [Depressed Mood] : not depressed [Flat Affect] : affect not flat [Normal] : external genitalia [Motion Tenderness] : there was no cervical motion tenderness [Tenderness] : nontender [Adnexa Tenderness] : were not tender [Doing Well] : is doing well [No Sign of Infection] : is showing no signs of infection [Excellent Pain Control] : has excellent pain control [FreeTextEntry8] : This 42 yo  presents for PPV after vaginal delivery; voiding and stooling without issue, desires to use condoms. [de-identified] : Nl PPV [de-identified] : Kegel exercise sheet in hand; RTO for annual and pap due to hx of ASCUS pap +HRHPV

## 2022-05-15 LAB — SURGICAL PATHOLOGY STUDY: SIGNIFICANT CHANGE UP

## 2022-05-31 ENCOUNTER — TRANSCRIPTION ENCOUNTER (OUTPATIENT)
Age: 42
End: 2022-05-31

## 2022-05-31 DIAGNOSIS — B37.9 CANDIDIASIS, UNSPECIFIED: ICD-10-CM

## 2022-09-09 ENCOUNTER — NON-APPOINTMENT (OUTPATIENT)
Age: 42
End: 2022-09-09

## 2022-09-09 ENCOUNTER — TRANSCRIPTION ENCOUNTER (OUTPATIENT)
Age: 42
End: 2022-09-09

## 2022-09-27 ENCOUNTER — APPOINTMENT (OUTPATIENT)
Dept: OBGYN | Facility: CLINIC | Age: 42
End: 2022-09-27

## 2022-09-27 VITALS
BODY MASS INDEX: 26.62 KG/M2 | SYSTOLIC BLOOD PRESSURE: 116 MMHG | HEIGHT: 61 IN | HEART RATE: 92 BPM | DIASTOLIC BLOOD PRESSURE: 77 MMHG | WEIGHT: 141 LBS

## 2022-09-27 DIAGNOSIS — B37.89 OTHER SITES OF CANDIDIASIS: ICD-10-CM

## 2022-09-27 DIAGNOSIS — Z01.419 ENCOUNTER FOR GYNECOLOGICAL EXAMINATION (GENERAL) (ROUTINE) W/OUT ABNORMAL FINDINGS: ICD-10-CM

## 2022-09-27 DIAGNOSIS — Z80.0 FAMILY HISTORY OF MALIGNANT NEOPLASM OF DIGESTIVE ORGANS: ICD-10-CM

## 2022-09-27 DIAGNOSIS — Z78.9 OTHER SPECIFIED HEALTH STATUS: ICD-10-CM

## 2022-09-27 PROCEDURE — 99396 PREV VISIT EST AGE 40-64: CPT

## 2022-09-27 RX ORDER — FLUCONAZOLE 100 MG/1
100 TABLET ORAL
Qty: 16 | Refills: 0 | Status: COMPLETED | COMMUNITY
Start: 2022-05-31 | End: 2022-09-27

## 2022-09-27 NOTE — PHYSICAL EXAM
[Chaperone Present] : A chaperone was present in the examining room during all aspects of the physical examination [Appropriately responsive] : appropriately responsive [Alert] : alert [No Acute Distress] : no acute distress [Soft] : soft [Non-tender] : non-tender [No Lesions] : no lesions [Oriented x3] : oriented x3 [Examination Of The Breasts] : a normal appearance [No Discharge] : no discharge [No Masses] : no breast masses were palpable [Labia Majora] : normal [Labia Minora] : normal [No Bleeding] : There was no active vaginal bleeding [Normal] : normal [Uterine Adnexae] : non-palpable [Tenderness] : nontender

## 2022-10-04 LAB
CYTOLOGY CVX/VAG DOC THIN PREP: ABNORMAL
HPV HIGH+LOW RISK DNA PNL CVX: DETECTED

## 2022-10-12 ENCOUNTER — APPOINTMENT (OUTPATIENT)
Dept: OBGYN | Facility: CLINIC | Age: 42
End: 2022-10-12

## 2022-10-12 VITALS — SYSTOLIC BLOOD PRESSURE: 122 MMHG | DIASTOLIC BLOOD PRESSURE: 76 MMHG | HEART RATE: 85 BPM | HEIGHT: 61 IN

## 2022-10-12 DIAGNOSIS — R87.810 CERVICAL HIGH RISK HUMAN PAPILLOMAVIRUS (HPV) DNA TEST POSITIVE: ICD-10-CM

## 2022-10-12 LAB
HCG UR QL: NEGATIVE
QUALITY CONTROL: YES

## 2022-10-12 PROCEDURE — 81025 URINE PREGNANCY TEST: CPT

## 2022-10-12 PROCEDURE — 57454 BX/CURETT OF CERVIX W/SCOPE: CPT

## 2022-10-19 LAB — CORE LAB BIOPSY: NORMAL

## 2022-11-10 NOTE — OB RN PATIENT PROFILE - SPIRITUAL CULTURAL, CURRENT SITUATION, PROFILE
[Consideration of Curative Therapy] : consideration of curative therapy for prostate cancer
declines

## 2023-10-11 ENCOUNTER — LABORATORY RESULT (OUTPATIENT)
Age: 43
End: 2023-10-11

## 2023-10-11 ENCOUNTER — APPOINTMENT (OUTPATIENT)
Dept: OBGYN | Facility: CLINIC | Age: 43
End: 2023-10-11
Payer: COMMERCIAL

## 2023-10-11 VITALS
WEIGHT: 145 LBS | BODY MASS INDEX: 27.38 KG/M2 | HEIGHT: 61 IN | DIASTOLIC BLOOD PRESSURE: 73 MMHG | HEART RATE: 80 BPM | SYSTOLIC BLOOD PRESSURE: 111 MMHG

## 2023-10-11 DIAGNOSIS — R92.2 INCONCLUSIVE MAMMOGRAM: ICD-10-CM

## 2023-10-11 DIAGNOSIS — Z01.419 ENCOUNTER FOR GYNECOLOGICAL EXAMINATION (GENERAL) (ROUTINE) W/OUT ABNORMAL FINDINGS: ICD-10-CM

## 2023-10-11 DIAGNOSIS — R92.30 INCONCLUSIVE MAMMOGRAM: ICD-10-CM

## 2023-10-11 PROCEDURE — 99396 PREV VISIT EST AGE 40-64: CPT

## 2023-10-18 LAB
CYTOLOGY CVX/VAG DOC THIN PREP: ABNORMAL
HPV HIGH+LOW RISK DNA PNL CVX: DETECTED

## 2023-11-01 ENCOUNTER — APPOINTMENT (OUTPATIENT)
Dept: OBGYN | Facility: CLINIC | Age: 43
End: 2023-11-01

## 2023-11-15 ENCOUNTER — APPOINTMENT (OUTPATIENT)
Dept: OBGYN | Facility: CLINIC | Age: 43
End: 2023-11-15
Payer: COMMERCIAL

## 2023-11-15 VITALS
WEIGHT: 145 LBS | HEIGHT: 61 IN | DIASTOLIC BLOOD PRESSURE: 79 MMHG | BODY MASS INDEX: 27.38 KG/M2 | SYSTOLIC BLOOD PRESSURE: 137 MMHG | HEART RATE: 94 BPM

## 2023-11-15 PROCEDURE — 57454 BX/CURETT OF CERVIX W/SCOPE: CPT

## 2023-11-16 LAB
HCG UR QL: NEGATIVE
QUALITY CONTROL: YES

## 2023-11-21 ENCOUNTER — RESULT REVIEW (OUTPATIENT)
Age: 43
End: 2023-11-21

## 2023-11-21 ENCOUNTER — APPOINTMENT (OUTPATIENT)
Dept: ULTRASOUND IMAGING | Facility: IMAGING CENTER | Age: 43
End: 2023-11-21
Payer: COMMERCIAL

## 2023-11-21 ENCOUNTER — APPOINTMENT (OUTPATIENT)
Dept: MAMMOGRAPHY | Facility: IMAGING CENTER | Age: 43
End: 2023-11-21
Payer: COMMERCIAL

## 2023-11-21 ENCOUNTER — OUTPATIENT (OUTPATIENT)
Dept: OUTPATIENT SERVICES | Facility: HOSPITAL | Age: 43
LOS: 1 days | End: 2023-11-21
Payer: COMMERCIAL

## 2023-11-21 DIAGNOSIS — R92.2 INCONCLUSIVE MAMMOGRAM: ICD-10-CM

## 2023-11-21 DIAGNOSIS — Z01.419 ENCOUNTER FOR GYNECOLOGICAL EXAMINATION (GENERAL) (ROUTINE) WITHOUT ABNORMAL FINDINGS: ICD-10-CM

## 2023-11-21 PROCEDURE — 77063 BREAST TOMOSYNTHESIS BI: CPT | Mod: 26

## 2023-11-21 PROCEDURE — 77067 SCR MAMMO BI INCL CAD: CPT

## 2023-11-21 PROCEDURE — 76641 ULTRASOUND BREAST COMPLETE: CPT

## 2023-11-21 PROCEDURE — 77067 SCR MAMMO BI INCL CAD: CPT | Mod: 26

## 2023-11-21 PROCEDURE — 77063 BREAST TOMOSYNTHESIS BI: CPT

## 2023-11-21 PROCEDURE — 76641 ULTRASOUND BREAST COMPLETE: CPT | Mod: 26,50

## 2023-12-01 LAB — CORE LAB BIOPSY: NORMAL

## 2024-02-08 ENCOUNTER — TRANSCRIPTION ENCOUNTER (OUTPATIENT)
Age: 44
End: 2024-02-08

## 2024-02-22 ENCOUNTER — TRANSCRIPTION ENCOUNTER (OUTPATIENT)
Age: 44
End: 2024-02-22

## 2024-10-03 ENCOUNTER — NON-APPOINTMENT (OUTPATIENT)
Age: 44
End: 2024-10-03

## 2024-10-20 PROBLEM — Z01.419 ENCOUNTER FOR GYNECOLOGICAL EXAMINATION WITHOUT ABNORMAL FINDING: Status: ACTIVE | Noted: 2024-10-20 | Resolved: 2024-11-03

## 2024-11-19 ENCOUNTER — APPOINTMENT (OUTPATIENT)
Dept: MAMMOGRAPHY | Facility: IMAGING CENTER | Age: 44
End: 2024-11-19
Payer: COMMERCIAL

## 2024-11-19 ENCOUNTER — RESULT REVIEW (OUTPATIENT)
Age: 44
End: 2024-11-19

## 2024-11-19 ENCOUNTER — OUTPATIENT (OUTPATIENT)
Dept: OUTPATIENT SERVICES | Facility: HOSPITAL | Age: 44
LOS: 1 days | End: 2024-11-19
Payer: COMMERCIAL

## 2024-11-19 ENCOUNTER — APPOINTMENT (OUTPATIENT)
Dept: ULTRASOUND IMAGING | Facility: IMAGING CENTER | Age: 44
End: 2024-11-19
Payer: COMMERCIAL

## 2024-11-19 DIAGNOSIS — Z01.419 ENCOUNTER FOR GYNECOLOGICAL EXAMINATION (GENERAL) (ROUTINE) WITHOUT ABNORMAL FINDINGS: ICD-10-CM

## 2024-11-19 PROCEDURE — 77063 BREAST TOMOSYNTHESIS BI: CPT | Mod: 26

## 2024-11-19 PROCEDURE — 76641 ULTRASOUND BREAST COMPLETE: CPT | Mod: 26,50

## 2024-11-19 PROCEDURE — 77063 BREAST TOMOSYNTHESIS BI: CPT

## 2024-11-19 PROCEDURE — 77067 SCR MAMMO BI INCL CAD: CPT | Mod: 26

## 2024-11-19 PROCEDURE — 77067 SCR MAMMO BI INCL CAD: CPT

## 2024-11-19 PROCEDURE — 76641 ULTRASOUND BREAST COMPLETE: CPT

## 2025-06-05 ENCOUNTER — TRANSCRIPTION ENCOUNTER (OUTPATIENT)
Age: 45
End: 2025-06-05

## 2025-07-02 ENCOUNTER — TRANSCRIPTION ENCOUNTER (OUTPATIENT)
Age: 45
End: 2025-07-02

## 2025-07-11 ENCOUNTER — TRANSCRIPTION ENCOUNTER (OUTPATIENT)
Age: 45
End: 2025-07-11

## 2025-07-23 ENCOUNTER — APPOINTMENT (OUTPATIENT)
Dept: OBGYN | Facility: CLINIC | Age: 45
End: 2025-07-23
Payer: COMMERCIAL

## 2025-07-23 VITALS — BODY MASS INDEX: 28.91 KG/M2 | WEIGHT: 153 LBS

## 2025-07-23 VITALS — DIASTOLIC BLOOD PRESSURE: 83 MMHG | SYSTOLIC BLOOD PRESSURE: 125 MMHG | HEIGHT: 61 IN

## 2025-07-23 DIAGNOSIS — N93.9 ABNORMAL UTERINE AND VAGINAL BLEEDING, UNSPECIFIED: ICD-10-CM

## 2025-07-23 PROCEDURE — 99214 OFFICE O/P EST MOD 30 MIN: CPT

## 2025-07-28 LAB
ANTI-MUELLERIAN HORMONE: 0.57 NG/ML
ESTIMATED AVERAGE GLUCOSE: 117 MG/DL
ESTRADIOL SERPL-MCNC: 73 PG/ML
FSH SERPL-MCNC: 5.1 IU/L
HBA1C MFR BLD HPLC: 5.7 %
HCG SERPL-MCNC: <1 MIU/ML
HCT VFR BLD CALC: 38.7 %
HGB BLD-MCNC: 12.6 G/DL
INSULIN SERPL-MCNC: 6.6 UU/ML
LH SERPL-ACNC: 6.3 IU/L
MCHC RBC-ENTMCNC: 27.2 PG
MCHC RBC-ENTMCNC: 32.6 G/DL
MCV RBC AUTO: 83.6 FL
PLATELET # BLD AUTO: 300 K/UL
PROLACTIN SERPL-MCNC: 11.2 NG/ML
RBC # BLD: 4.63 M/UL
RBC # FLD: 13.2 %
TESTOST FREE SERPL-MCNC: 1.2 PG/ML
TESTOST SERPL-MCNC: 19.5 NG/DL
TSH SERPL-ACNC: 3.5 UIU/ML
WBC # FLD AUTO: 4.95 K/UL

## 2025-07-30 ENCOUNTER — APPOINTMENT (OUTPATIENT)
Dept: OBGYN | Facility: CLINIC | Age: 45
End: 2025-07-30
Payer: COMMERCIAL

## 2025-07-30 ENCOUNTER — ASOB RESULT (OUTPATIENT)
Age: 45
End: 2025-07-30

## 2025-07-30 VITALS
HEART RATE: 88 BPM | SYSTOLIC BLOOD PRESSURE: 115 MMHG | DIASTOLIC BLOOD PRESSURE: 77 MMHG | HEIGHT: 61 IN | BODY MASS INDEX: 28.32 KG/M2 | WEIGHT: 150 LBS

## 2025-07-30 PROCEDURE — 76830 TRANSVAGINAL US NON-OB: CPT

## 2025-07-30 PROCEDURE — 99213 OFFICE O/P EST LOW 20 MIN: CPT

## 2025-08-04 LAB — DHEA-SULFATE, SERUM: 131 UG/DL

## 2025-08-06 ENCOUNTER — TRANSCRIPTION ENCOUNTER (OUTPATIENT)
Age: 45
End: 2025-08-06